# Patient Record
Sex: FEMALE | Race: WHITE | Employment: OTHER | URBAN - METROPOLITAN AREA
[De-identification: names, ages, dates, MRNs, and addresses within clinical notes are randomized per-mention and may not be internally consistent; named-entity substitution may affect disease eponyms.]

---

## 2017-04-17 ENCOUNTER — HOSPITAL ENCOUNTER (OUTPATIENT)
Dept: PHYSICAL THERAPY | Age: 82
End: 2017-04-17

## 2017-04-28 ENCOUNTER — HOSPITAL ENCOUNTER (OUTPATIENT)
Dept: PHYSICAL THERAPY | Age: 82
Discharge: HOME | End: 2017-04-28
Payer: MEDICARE

## 2017-04-28 PROCEDURE — G8982 BODY POS GOAL STATUS: HCPCS

## 2017-04-28 PROCEDURE — G8981 BODY POS CURRENT STATUS: HCPCS

## 2017-04-28 PROCEDURE — 97161 PT EVAL LOW COMPLEX 20 MIN: CPT

## 2017-04-28 NOTE — PROGRESS NOTES
Mimi Dickerson  : [address] [facility] [place] [id number] [facility], [address] [place] [address],[address], [place] [address].  [phone]   [phone]           OUTPATIENT PHYSICAL THERAPY:Initial Assessment 2017    ICD-10: Treatment Diagnosis: Other abnormalities of gait and mobility R26.89  Precautions/Allergies:   Review of patient's allergies indicates no known allergies. Fall Risk Score: 4 (? 5 = High Risk)  MD Orders: eval and treat MEDICAL/REFERRING DIAGNOSIS:  Balance problem [R26.89]   DATE OF ONSET: Patient unable to give time. REFERRING PHYSICIAN: Nikolay Radford MD  RETURN PHYSICIAN APPOINTMENT:      INITIAL ASSESSMENT:  Ms. Kermit Tomlinson presents with mild balance impairments which should improve with PT intervention. Patient requires some repetition of verbal instruction to complete motor commands. Patient lives alone. Patient would benefit from PT to address these problems to improve patient's independence and safety with mobility and daily activities. Thank you. PROBLEM LIST (Impacting functional limitations):  1. Decreased Strength  2. Decreased ADL/Functional Activities  3. Decreased Transfer Abilities  4. Decreased Ambulation Ability/Technique  5. Decreased Balance  6. Decreased [place] with Home Exercise Program INTERVENTIONS PLANNED:  1. Balance Exercise  2. Home Exercise Program (HEP)  3. Neuromuscular Re-education/Strengthening  4. Therapeutic Activites  5. Therapeutic Exercise/Strengthening   6. TREATMENT PLAN:  Effective Dates: 2017 TO 2017. Frequency/Duration: 1-2 times a week for 4-8 weeks  GOALS: (Goals have been discussed and agreed upon with patient.)  Short-Term Functional Goals: Time Frame: 2-3 weeks  1. Patient will demonstrate independence and compliance with home exercise program to improve balance and strength for daily activities. 2.   Discharge Goals: Time Frame: 4-8 weeks  1.  Patient will increase her score on the Hernandez Balance Scale to greater than or equal to 54/56 indicating improved safety and decreased fall risk for daily activities. 2. Patient will ambulate with least assistive device over level and unlevel surfaces without evidence of imbalance to improve safety for daily activities. 3. Patient will increase bilateral lower extremity strength to greater than or equal to 4+/5 to improve strength for functional mobility activities. 4.   Rehabilitation Potential For Stated Goals: Good  Regarding Cami Chino's therapy, I certify that the treatment plan above will be carried out by a therapist or under their direction. Thank you for this referral,  Avni Nicole PT     Referring Physician Signature: Bossman Arredondo MD              Date                    The information in this section was collected on 4/28/17 (except where otherwise noted). HISTORY:   History of Present Injury/Illness (Reason for Referral):  Dementia, Imbalance. Patient reports no falls and states no problems with balance. No AD. Past Medical History/Comorbidities:   Ms. Keven Tomas  has a past medical history of Unspecified hypothyroidism. Ms. Keven Tomas  has no past surgical history on file. Dementia. Social History/Living Environment:     lives alone with small dog. One story house. A few steps to enter with rail. Prior Level of Function/Work/Activity:  Independent. Patient states she drives short distances. Does own housecleaning and cooking. Dominant Side:         RIGHT    Current Medications:       Current Outpatient Prescriptions:     levothyroxine (SYNTHROID) 75 mcg tablet, TAKE ONE TABLET BY MOUTH ONCE DAILY. , Disp: 30 Tab, Rfl: 5    rivastigmine tartrate (EXELON) 6 mg capsule, Take 1 Cap by mouth two (2) times daily (with meals). , Disp: 60 Cap, Rfl: 5    escitalopram oxalate (LEXAPRO) 5 mg tablet, Take 1 Tab by mouth daily. , Disp: 30 Tab, Rfl: 5   Date Last Reviewed:  4/28/17   Number of Personal Factors/Comorbidities that affect the Plan of Care: 1-2: MODERATE COMPLEXITY   EXAMINATION:   Observation/Orthostatic Postural Assessment:          WNL    Strength:          LE STRENGTH:  4+/5 hip flexion, 4+/5 hip abduction, 4+/5 hip adduction, 4+/5 hip extension, 4+/5 knee extension, 4+/5 knee flexion, 4+/5 ankle dorsiflexion, 4+/5 ankle plantarflexion. Functional Mobility:         Gait/Ambulation:  Patient ambulates with no AD with a normal gait pattern. She demonstrates decreased step clearance at times. Transfers:  independent        Bed Mobility:  independent        Stairs:  With rail, step over step  Sensation:         intact  Postural Control & Balance:  · Haro Balance Scale:  50/56.   (A score less than 45/56 indicates high risk of falls)     · Dynamic Gait Index:  23/24.   (A score less than or equal to19/24 is abnormal and predictive of falls)          Body Structures Involved:  1. Nerves  2. Eyes and Ears  3. Muscles Body Functions Affected:  1. Neuromusculoskeletal  2. Movement Related Activities and Participation Affected:  1. Mobility  2. Domestic Life  3. Community, Social and [place]   Number of elements (examined above) that affect the Plan of Care: 1-2: LOW COMPLEXITY   CLINICAL PRESENTATION:   Presentation: Stable and uncomplicated: LOW COMPLEXITY   CLINICAL DECISION MAKING:   Outcome Measure: Tool Used: Haro Balance Scale  Score:  Initial: 50/56 Most Recent: X/56 (Date: -- )   Interpretation of Score: Each section is scored on a 0-4 scale, 0 representing the patients inability to perform the task and 4 representing independence. The scores of each section are added together for a total score of 56. The higher the patients score, the more independent the patient is. Any score below 45 indicates increased risk for falls. Score 56 55-45 44-34 33-23 22-12 11-1 0   Modifier CH CI CJ CK CL CM CN     ?  Changing and Maintaining Body Position:    [id number] - CURRENT STATUS: CI - 1%-19% impaired, limited or restricted    - GOAL STATUS: CI - 1%-19% impaired, limited or restricted    - D/C STATUS:  ---------------To be determined---------------    Tool Used: Dynamic Gait Index  Score:  Initial: 23/24 Most Recent: X/24 (Date: -- )   Interpretation of Score: Each section is scored on a 0-3 scale, 0 representing the patients inability to perform the task and 3 representing independence. The scores of each section are added together for a total score of 24. Any score below 19 indicates increased risk for falls. Score 24 23-19 18-15 14-10 9-5 4-1 0   Modifier CH CI CJ CK CL CM CN         Medical Necessity:   · Patient is expected to demonstrate progress in strength, balance and functional technique to improve safety during daily activities. Reason for Services/Other Comments:  · Patient continues to demonstrate capacity to improve strength, balance, gait which will increase independence and increase safety. Use of outcome tool(s) and clinical judgement create a POC that gives a: Clear prediction of patient's progress: LOW COMPLEXITY            TREATMENT:   (In addition to Assessment/Re-Assessment sessions the following treatments were rendered)  Pre-treatment Symptoms/Complaints:  No complaints. Doing well. Lives alone. Pain: Initial:   Pain Intensity 1: 0 0 Post Session:  0     Assessment only today, no treatment provided. Neuromuscular Re-education ( ):  Exercise/activities per grid below to improve balance, coordination and kinesthetic sense. Required minimal verbal cues to promote static and dynamic balance in standing. Treatment/Session Assessment:    · Response to Treatment:  Patient tolerated assessment well. Balance is fairly good, but tripped walking on tile in balance room. .  · Compliance with Program/Exercises: Will assess as treatment progresses. · Recommendations/Intent for next treatment session: \"Next visit will focus on advancements to more challenging activities\".   Total Treatment Duration:  PT Patient Time In/Time Out  Time In: 1008  Time Out: Tabitha Jackson 316 Rahel Carter, PT

## 2017-04-28 NOTE — PROGRESS NOTES
Ambulatory/Rehab Services H2 Model Falls Risk Assessment    Risk Factor Pts. ·   Confusion/Disorientation/Impulsivity  [x]    4 ·   Symptomatic Depression  []   2 ·   Altered Elimination  []   1 ·   Dizziness/Vertigo  []   1 ·   Gender (Male)  []   1 ·   Any administered antiepileptics (anticonvulsants):  []   2 ·   Any administered benzodiazepines:  []   1 ·   Visual Impairment (specify):  []   1 ·   Portable Oxygen Use  []   1 ·   Orthostatic ? BP  []   1 ·   History of Recent Falls (within 3 mos.)  []   5     Ability to Rise from Chair (choose one) Pts. ·   Ability to rise in a single movement  [x]   0 ·   Pushes up, successful in one attempt  []   1 ·   Multiple attempts, but successful  []   3 ·   Unable to rise without assistance  []   4   Total: (5 or greater = High Risk) 4     Falls Prevention Plan:   []                Physical Limitations to Exercise (specify):   []                Mobility Assistance Device (type):   []                Exercise/Equipment Adaptation (specify):    ©2010 [facility] [address] Patent #[id number].  Federal Law prohibits the replication, distribution or use without written permission from [facility] Identified

## 2017-07-19 NOTE — PROGRESS NOTES
Oscar Ortega  : [address] [facility] [place] [address] [facility], [address], [address] [facility]  [phone]   [phone]           OUTPATIENT PHYSICAL THERAPY:Discontinuation Summary 2017    ICD-10: Treatment Diagnosis: Other abnormalities of gait and mobility R26.89  Precautions/Allergies:   Review of patient's allergies indicates no known allergies. Fall Risk Score: 4 (? 5 = High Risk)  MD Orders: eval and treat MEDICAL/REFERRING DIAGNOSIS:  Balance problem [R26.89]   DATE OF ONSET: Patient unable to give time. REFERRING PHYSICIAN: Shyann Washington MD  RETURN PHYSICIAN APPOINTMENT:      Discontinuation 17:  Ms. Roe Arguello attended one PT session on 17 for mild balance impairments which should improve with PT intervention. Patient did not come back to PT after evaluation and plan of treatment has . We will discharge patient at this time. Thank you. PROBLEM LIST (Impacting functional limitations):  1. Decreased Strength  2. Decreased ADL/Functional Activities  3. Decreased Transfer Abilities  4. Decreased Ambulation Ability/Technique  5. Decreased Balance  6. Decreased [place] with Home Exercise Program INTERVENTIONS PLANNED:  1. Balance Exercise  2. Home Exercise Program (HEP)  3. Neuromuscular Re-education/Strengthening  4. Therapeutic Activites  5. Therapeutic Exercise/Strengthening   6. TREATMENT PLAN:  Effective Dates: 2017 TO 2017. Frequency/Duration: discharge  GOALS: (Goals have been discussed and agreed upon with patient.)  Short-Term Functional Goals: Time Frame: 2-3 weeks  1. Patient will demonstrate independence and compliance with home exercise program to improve balance and strength for daily activities. Unable to reassess  2. Discharge Goals: Time Frame: 4-8 weeks  1.  Patient will increase her score on the Haro Balance Scale to greater than or equal to 54/56 indicating improved safety and decreased fall risk for daily activities. Unable to reassess  2. Patient will ambulate with least assistive device over level and unlevel surfaces without evidence of imbalance to improve safety for daily activities. Unable to reassess  3. Patient will increase bilateral lower extremity strength to greater than or equal to 4+/5 to improve strength for functional mobility activities. Unable to reassess  4. Rehabilitation Potential For Stated Goals: Good              The information in this section was collected on 4/28/17 (except where otherwise noted). HISTORY:   History of Present Injury/Illness (Reason for Referral):  Dementia, Imbalance. Patient reports no falls and states no problems with balance. No AD. Past Medical History/Comorbidities:   Ms. Karla Cuenca  has a past medical history of Unspecified hypothyroidism. Ms. Karla Cuenca  has no past surgical history on file. Dementia. Social History/Living Environment:     lives alone with small dog. One story house. A few steps to enter with rail. Prior Level of Function/Work/Activity:  Independent. Patient states she drives short distances. Does own housecleaning and cooking. Dominant Side:         RIGHT    Current Medications:       Current Outpatient Prescriptions:     levothyroxine (SYNTHROID) 75 mcg tablet, TAKE ONE TABLET BY MOUTH ONCE DAILY. , Disp: 30 Tab, Rfl: 5    rivastigmine tartrate (EXELON) 6 mg capsule, Take 1 Cap by mouth two (2) times daily (with meals). , Disp: 60 Cap, Rfl: 5    escitalopram oxalate (LEXAPRO) 5 mg tablet, Take 1 Tab by mouth daily. , Disp: 30 Tab, Rfl: 5   Date Last Reviewed:  4/28/17   Number of Personal Factors/Comorbidities that affect the Plan of Care: 1-2: MODERATE COMPLEXITY   EXAMINATION:   Observation/Orthostatic Postural Assessment:          WNL    Strength:          LE STRENGTH:  4+/5 hip flexion, 4+/5 hip abduction, 4+/5 hip adduction, 4+/5 hip extension, 4+/5 knee extension, 4+/5 knee flexion, 4+/5 ankle dorsiflexion, 4+/5 ankle plantarflexion. Functional Mobility:         Gait/Ambulation:  Patient ambulates with no AD with a normal gait pattern. She demonstrates decreased step clearance at times. Transfers:  independent        Bed Mobility:  independent        Stairs:  With rail, step over step  Sensation:         intact  Postural Control & Balance:  · Haro Balance Scale:  50/56.   (A score less than 45/56 indicates high risk of falls)     · Dynamic Gait Index:  23/24.   (A score less than or equal to19/24 is abnormal and predictive of falls)          Body Structures Involved:  1. Nerves  2. Eyes and Ears  3. Muscles Body Functions Affected:  1. Neuromusculoskeletal  2. Movement Related Activities and Participation Affected:  1. Mobility  2. Domestic Life  3. Community, Social and [place]   Number of elements (examined above) that affect the Plan of Care: 1-2: LOW COMPLEXITY   CLINICAL PRESENTATION:   Presentation: Stable and uncomplicated: LOW COMPLEXITY   CLINICAL DECISION MAKING:   Outcome Measure: Tool Used: Haro Balance Scale  Score:  Initial: 50/56 Most Recent: X/56 (Date: -- )   Interpretation of Score: Each section is scored on a 0-4 scale, 0 representing the patients inability to perform the task and 4 representing independence. The scores of each section are added together for a total score of 56. The higher the patients score, the more independent the patient is. Any score below 45 indicates increased risk for falls. Score 56 55-45 44-34 33-23 22-12 11-1 0   Modifier CH CI CJ CK CL CM CN     ?  Changing and Maintaining Body Position:    [id number] - CURRENT STATUS: CI - 1%-19% impaired, limited or restricted    - GOAL STATUS: CI - 1%-19% impaired, limited or restricted    - D/C STATUS:  unable to reassess    Tool Used: Dynamic Gait Index  Score:  Initial: 23/24 Most Recent: X/24 (Date: -- )   Interpretation of Score: Each section is scored on a 0-3 scale, 0 representing the patients inability to perform the task and 3 representing independence. The scores of each section are added together for a total score of 24. Any score below 19 indicates increased risk for falls. Score 24 23-19 18-15 14-10 9-5 4-1 0   Modifier CH CI CJ CK CL CM CN         Medical Necessity:   · Patient is expected to demonstrate progress in strength, balance and functional technique to improve safety during daily activities. Reason for Services/Other Comments:  · Patient continues to demonstrate capacity to improve strength, balance, gait which will increase independence and increase safety.    Use of outcome tool(s) and clinical judgement create a POC that gives a: Clear prediction of patient's progress: LOW COMPLEXITY            TREATMENT:   (In addition to Assessment/Re-Assessment sessions the following treatments were rendered)    Jessica Steinberg, PT

## 2017-08-23 PROBLEM — R26.9 GAIT DISTURBANCE: Status: ACTIVE | Noted: 2017-08-23

## 2017-08-23 PROBLEM — F03.90 DEMENTIA WITHOUT BEHAVIORAL DISTURBANCE (HCC): Status: ACTIVE | Noted: 2017-08-23

## 2017-08-23 PROBLEM — Z79.899 ENCOUNTER FOR MEDICATION MANAGEMENT: Status: ACTIVE | Noted: 2017-08-23

## 2017-08-23 PROBLEM — G30.0 EARLY ONSET ALZHEIMER'S DISEASE WITH BEHAVIORAL DISTURBANCE (HCC): Status: ACTIVE | Noted: 2017-08-23

## 2017-08-23 PROBLEM — F02.818 EARLY ONSET ALZHEIMER'S DISEASE WITH BEHAVIORAL DISTURBANCE (HCC): Status: ACTIVE | Noted: 2017-08-23

## 2017-09-22 ENCOUNTER — HOME HEALTH ADMISSION (OUTPATIENT)
Dept: HOME HEALTH SERVICES | Facility: HOME HEALTH | Age: 82
End: 2017-09-22
Payer: MEDICARE

## 2017-09-23 ENCOUNTER — HOME CARE VISIT (OUTPATIENT)
Dept: SCHEDULING | Facility: HOME HEALTH | Age: 82
End: 2017-09-23
Payer: MEDICARE

## 2017-09-23 PROCEDURE — 400013 HH SOC

## 2017-09-23 PROCEDURE — 3331090001 HH PPS REVENUE CREDIT

## 2017-09-23 PROCEDURE — 3331090002 HH PPS REVENUE DEBIT

## 2017-09-23 PROCEDURE — G0299 HHS/HOSPICE OF RN EA 15 MIN: HCPCS

## 2017-09-24 PROCEDURE — 3331090002 HH PPS REVENUE DEBIT

## 2017-09-24 PROCEDURE — 3331090001 HH PPS REVENUE CREDIT

## 2017-09-25 PROCEDURE — 3331090002 HH PPS REVENUE DEBIT

## 2017-09-25 PROCEDURE — 3331090001 HH PPS REVENUE CREDIT

## 2017-09-26 ENCOUNTER — HOME CARE VISIT (OUTPATIENT)
Dept: SCHEDULING | Facility: HOME HEALTH | Age: 82
End: 2017-09-26
Payer: MEDICARE

## 2017-09-26 VITALS — RESPIRATION RATE: 20 BRPM | HEART RATE: 88 BPM

## 2017-09-26 PROCEDURE — 3331090001 HH PPS REVENUE CREDIT

## 2017-09-26 PROCEDURE — 3331090002 HH PPS REVENUE DEBIT

## 2017-09-26 PROCEDURE — G0299 HHS/HOSPICE OF RN EA 15 MIN: HCPCS

## 2017-09-26 PROCEDURE — G0156 HHCP-SVS OF AIDE,EA 15 MIN: HCPCS

## 2017-09-27 ENCOUNTER — HOME CARE VISIT (OUTPATIENT)
Dept: SCHEDULING | Facility: HOME HEALTH | Age: 82
End: 2017-09-27
Payer: MEDICARE

## 2017-09-27 VITALS
RESPIRATION RATE: 16 BRPM | TEMPERATURE: 97.2 F | HEART RATE: 90 BPM | SYSTOLIC BLOOD PRESSURE: 100 MMHG | DIASTOLIC BLOOD PRESSURE: 62 MMHG | OXYGEN SATURATION: 98 %

## 2017-09-27 VITALS
DIASTOLIC BLOOD PRESSURE: 68 MMHG | TEMPERATURE: 97.6 F | SYSTOLIC BLOOD PRESSURE: 110 MMHG | HEART RATE: 64 BPM | RESPIRATION RATE: 17 BRPM

## 2017-09-27 PROCEDURE — 3331090002 HH PPS REVENUE DEBIT

## 2017-09-27 PROCEDURE — 3331090001 HH PPS REVENUE CREDIT

## 2017-09-27 PROCEDURE — G0151 HHCP-SERV OF PT,EA 15 MIN: HCPCS

## 2017-09-28 ENCOUNTER — HOME CARE VISIT (OUTPATIENT)
Dept: SCHEDULING | Facility: HOME HEALTH | Age: 82
End: 2017-09-28
Payer: MEDICARE

## 2017-09-28 PROCEDURE — 3331090002 HH PPS REVENUE DEBIT

## 2017-09-28 PROCEDURE — 3331090001 HH PPS REVENUE CREDIT

## 2017-09-28 PROCEDURE — G0155 HHCP-SVS OF CSW,EA 15 MIN: HCPCS

## 2017-09-29 ENCOUNTER — HOME CARE VISIT (OUTPATIENT)
Dept: SCHEDULING | Facility: HOME HEALTH | Age: 82
End: 2017-09-29
Payer: MEDICARE

## 2017-09-29 PROCEDURE — G0299 HHS/HOSPICE OF RN EA 15 MIN: HCPCS

## 2017-09-29 PROCEDURE — 3331090002 HH PPS REVENUE DEBIT

## 2017-09-29 PROCEDURE — 3331090001 HH PPS REVENUE CREDIT

## 2017-09-30 ENCOUNTER — HOME CARE VISIT (OUTPATIENT)
Dept: HOME HEALTH SERVICES | Facility: HOME HEALTH | Age: 82
End: 2017-09-30
Payer: MEDICARE

## 2017-09-30 PROCEDURE — 3331090002 HH PPS REVENUE DEBIT

## 2017-09-30 PROCEDURE — 3331090001 HH PPS REVENUE CREDIT

## 2017-10-01 PROCEDURE — 3331090002 HH PPS REVENUE DEBIT

## 2017-10-01 PROCEDURE — 3331090001 HH PPS REVENUE CREDIT

## 2017-10-02 PROCEDURE — 3331090002 HH PPS REVENUE DEBIT

## 2017-10-02 PROCEDURE — 3331090001 HH PPS REVENUE CREDIT

## 2017-10-03 ENCOUNTER — HOME CARE VISIT (OUTPATIENT)
Dept: SCHEDULING | Facility: HOME HEALTH | Age: 82
End: 2017-10-03
Payer: MEDICARE

## 2017-10-03 VITALS
DIASTOLIC BLOOD PRESSURE: 72 MMHG | OXYGEN SATURATION: 98 % | RESPIRATION RATE: 17 BRPM | HEART RATE: 72 BPM | SYSTOLIC BLOOD PRESSURE: 118 MMHG | TEMPERATURE: 97.5 F

## 2017-10-03 PROCEDURE — 3331090001 HH PPS REVENUE CREDIT

## 2017-10-03 PROCEDURE — 3331090002 HH PPS REVENUE DEBIT

## 2017-10-03 PROCEDURE — G0299 HHS/HOSPICE OF RN EA 15 MIN: HCPCS

## 2017-10-04 VITALS
RESPIRATION RATE: 16 BRPM | TEMPERATURE: 97.8 F | DIASTOLIC BLOOD PRESSURE: 68 MMHG | HEART RATE: 78 BPM | SYSTOLIC BLOOD PRESSURE: 120 MMHG | OXYGEN SATURATION: 99 %

## 2017-10-04 PROCEDURE — 3331090001 HH PPS REVENUE CREDIT

## 2017-10-04 PROCEDURE — 3331090002 HH PPS REVENUE DEBIT

## 2017-10-05 PROCEDURE — 3331090002 HH PPS REVENUE DEBIT

## 2017-10-05 PROCEDURE — 3331090001 HH PPS REVENUE CREDIT

## 2017-10-06 ENCOUNTER — HOME CARE VISIT (OUTPATIENT)
Dept: SCHEDULING | Facility: HOME HEALTH | Age: 82
End: 2017-10-06
Payer: MEDICARE

## 2017-10-06 PROCEDURE — 3331090002 HH PPS REVENUE DEBIT

## 2017-10-06 PROCEDURE — 3331090001 HH PPS REVENUE CREDIT

## 2017-10-07 PROCEDURE — 3331090002 HH PPS REVENUE DEBIT

## 2017-10-07 PROCEDURE — 3331090001 HH PPS REVENUE CREDIT

## 2017-10-08 PROCEDURE — 3331090002 HH PPS REVENUE DEBIT

## 2017-10-08 PROCEDURE — 3331090001 HH PPS REVENUE CREDIT

## 2017-10-09 PROCEDURE — 3331090002 HH PPS REVENUE DEBIT

## 2017-10-09 PROCEDURE — 3331090001 HH PPS REVENUE CREDIT

## 2017-10-10 PROCEDURE — 3331090002 HH PPS REVENUE DEBIT

## 2017-10-10 PROCEDURE — 3331090001 HH PPS REVENUE CREDIT

## 2017-10-11 ENCOUNTER — HOME CARE VISIT (OUTPATIENT)
Dept: SCHEDULING | Facility: HOME HEALTH | Age: 82
End: 2017-10-11
Payer: MEDICARE

## 2017-10-11 PROCEDURE — 3331090001 HH PPS REVENUE CREDIT

## 2017-10-11 PROCEDURE — 3331090002 HH PPS REVENUE DEBIT

## 2017-10-11 PROCEDURE — G0299 HHS/HOSPICE OF RN EA 15 MIN: HCPCS

## 2017-10-12 VITALS
DIASTOLIC BLOOD PRESSURE: 60 MMHG | OXYGEN SATURATION: 98 % | RESPIRATION RATE: 16 BRPM | TEMPERATURE: 98.7 F | HEART RATE: 66 BPM | SYSTOLIC BLOOD PRESSURE: 108 MMHG

## 2017-10-12 PROCEDURE — 3331090002 HH PPS REVENUE DEBIT

## 2017-10-12 PROCEDURE — 3331090001 HH PPS REVENUE CREDIT

## 2017-10-13 PROCEDURE — 3331090001 HH PPS REVENUE CREDIT

## 2017-10-13 PROCEDURE — 3331090002 HH PPS REVENUE DEBIT

## 2017-10-14 ENCOUNTER — HOSPITAL ENCOUNTER (EMERGENCY)
Age: 82
Discharge: HOME | End: 2017-10-14
Attending: EMERGENCY MEDICINE
Payer: MEDICARE

## 2017-10-14 VITALS
TEMPERATURE: 97.9 F | OXYGEN SATURATION: 100 % | WEIGHT: 95 LBS | HEART RATE: 65 BPM | DIASTOLIC BLOOD PRESSURE: 59 MMHG | BODY MASS INDEX: 18.65 KG/M2 | HEIGHT: 60 IN | SYSTOLIC BLOOD PRESSURE: 137 MMHG | RESPIRATION RATE: 16 BRPM

## 2017-10-14 DIAGNOSIS — R63.0 DECREASED APPETITE: ICD-10-CM

## 2017-10-14 DIAGNOSIS — G30.9 ALZHEIMER'S DEMENTIA WITH BEHAVIORAL DISTURBANCE, UNSPECIFIED TIMING OF DEMENTIA ONSET: Primary | ICD-10-CM

## 2017-10-14 DIAGNOSIS — F02.81 ALZHEIMER'S DEMENTIA WITH BEHAVIORAL DISTURBANCE, UNSPECIFIED TIMING OF DEMENTIA ONSET: Primary | ICD-10-CM

## 2017-10-14 LAB
ALBUMIN SERPL-MCNC: 3.9 G/DL (ref 3.2–4.6)
ALBUMIN/GLOB SERPL: 1.2 {RATIO} (ref 1.2–3.5)
ALP SERPL-CCNC: 61 U/L (ref 50–136)
ALT SERPL-CCNC: 22 U/L (ref 12–65)
ANION GAP SERPL CALC-SCNC: 8 MMOL/L (ref 7–16)
AST SERPL-CCNC: 24 U/L (ref 15–37)
BACTERIA URNS QL MICRO: 0 /HPF
BASOPHILS # BLD: 0 K/UL (ref 0–0.2)
BASOPHILS NFR BLD: 0 % (ref 0–2)
BILIRUB SERPL-MCNC: 0.7 MG/DL (ref 0.2–1.1)
BUN SERPL-MCNC: 20 MG/DL (ref 8–23)
CALCIUM SERPL-MCNC: 9.3 MG/DL (ref 8.3–10.4)
CASTS URNS QL MICRO: NORMAL /LPF
CHLORIDE SERPL-SCNC: 100 MMOL/L (ref 98–107)
CO2 SERPL-SCNC: 29 MMOL/L (ref 21–32)
CREAT SERPL-MCNC: 0.79 MG/DL (ref 0.6–1)
DIFFERENTIAL METHOD BLD: ABNORMAL
EOSINOPHIL # BLD: 0 K/UL (ref 0–0.8)
EOSINOPHIL NFR BLD: 0 % (ref 0.5–7.8)
EPI CELLS #/AREA URNS HPF: NORMAL /HPF
ERYTHROCYTE [DISTWIDTH] IN BLOOD BY AUTOMATED COUNT: 12.9 % (ref 11.9–14.6)
GLOBULIN SER CALC-MCNC: 3.3 G/DL (ref 2.3–3.5)
GLUCOSE SERPL-MCNC: 84 MG/DL (ref 65–100)
HCT VFR BLD AUTO: 43.9 % (ref 35.8–46.3)
HGB BLD-MCNC: 15.3 G/DL (ref 11.7–15.4)
IMM GRANULOCYTES # BLD: 0 K/UL (ref 0–0.5)
IMM GRANULOCYTES NFR BLD: 0.2 % (ref 0–5)
LIPASE SERPL-CCNC: 195 U/L (ref 73–393)
LYMPHOCYTES # BLD: 1.7 K/UL (ref 0.5–4.6)
LYMPHOCYTES NFR BLD: 35 % (ref 13–44)
MAGNESIUM SERPL-MCNC: 1.8 MG/DL (ref 1.8–2.4)
MCH RBC QN AUTO: 33.2 PG (ref 26.1–32.9)
MCHC RBC AUTO-ENTMCNC: 34.9 G/DL (ref 31.4–35)
MCV RBC AUTO: 95.2 FL (ref 79.6–97.8)
MONOCYTES # BLD: 0.8 K/UL (ref 0.1–1.3)
MONOCYTES NFR BLD: 16 % (ref 4–12)
NEUTS SEG # BLD: 2.4 K/UL (ref 1.7–8.2)
NEUTS SEG NFR BLD: 49 % (ref 43–78)
PHOSPHATE SERPL-MCNC: 3.2 MG/DL (ref 2.3–3.7)
PLATELET # BLD AUTO: 221 K/UL (ref 150–450)
PMV BLD AUTO: 10.3 FL (ref 10.8–14.1)
POTASSIUM SERPL-SCNC: 3.9 MMOL/L (ref 3.5–5.1)
PROT SERPL-MCNC: 7.2 G/DL (ref 6.3–8.2)
RBC # BLD AUTO: 4.61 M/UL (ref 4.05–5.25)
RBC #/AREA URNS HPF: NORMAL /HPF
SODIUM SERPL-SCNC: 137 MMOL/L (ref 136–145)
WBC # BLD AUTO: 4.9 K/UL (ref 4.3–11.1)
WBC URNS QL MICRO: NORMAL /HPF

## 2017-10-14 PROCEDURE — 3331090001 HH PPS REVENUE CREDIT

## 2017-10-14 PROCEDURE — 84100 ASSAY OF PHOSPHORUS: CPT | Performed by: EMERGENCY MEDICINE

## 2017-10-14 PROCEDURE — 83735 ASSAY OF MAGNESIUM: CPT | Performed by: EMERGENCY MEDICINE

## 2017-10-14 PROCEDURE — 99283 EMERGENCY DEPT VISIT LOW MDM: CPT | Performed by: EMERGENCY MEDICINE

## 2017-10-14 PROCEDURE — 85025 COMPLETE CBC W/AUTO DIFF WBC: CPT | Performed by: EMERGENCY MEDICINE

## 2017-10-14 PROCEDURE — 83690 ASSAY OF LIPASE: CPT | Performed by: EMERGENCY MEDICINE

## 2017-10-14 PROCEDURE — 3331090002 HH PPS REVENUE DEBIT

## 2017-10-14 PROCEDURE — 81003 URINALYSIS AUTO W/O SCOPE: CPT | Performed by: EMERGENCY MEDICINE

## 2017-10-14 PROCEDURE — 80053 COMPREHEN METABOLIC PANEL: CPT | Performed by: EMERGENCY MEDICINE

## 2017-10-14 PROCEDURE — 81015 MICROSCOPIC EXAM OF URINE: CPT | Performed by: EMERGENCY MEDICINE

## 2017-10-14 NOTE — ED PROVIDER NOTES
HPI Comments: 79 yo female with history of Alzheimer's presents with decreased appetite that is progressively worsened over the course the past 3 weeks. Family present at bedside state that she only has 1 small meal about every 3 days. Patient states that she just isn't hungry. Family states that they have gotten her favorite foods and she still will not eat. States that the patient had 2 cups of milk. Patient recently treated for UTI with Keflex. Patient denies chest pain, shortness of breath, nausea, vomiting, headache, dizziness, focal weakness, abdominal pain, constipation, fever, chills, dysuria. Patient reportedly has intermittent loose stools for the past several days. Rates symptoms as mild to moderate. Them stating that she has lost 4 pounds since last doctor's appointment. The history is provided by the patient and a relative. No  was used. Past Medical History:   Diagnosis Date    Unspecified hypothyroidism        History reviewed. No pertinent surgical history. Family History:   Problem Relation Age of Onset    No Known Problems Mother     No Known Problems Father        Social History     Social History    Marital status:      Spouse name: N/A    Number of children: N/A    Years of education: N/A     Occupational History    Not on file. Social History Main Topics    Smoking status: Never Smoker    Smokeless tobacco: Never Used    Alcohol use No    Drug use: No    Sexual activity: Not on file     Other Topics Concern    Not on file     Social History Narrative         ALLERGIES: Review of patient's allergies indicates no known allergies. Review of Systems   Constitutional: Positive for appetite change and unexpected weight change. Negative for chills, fatigue and fever. HENT: Negative for congestion, rhinorrhea and trouble swallowing. Respiratory: Negative for cough and shortness of breath.     Cardiovascular: Negative for chest pain, palpitations and leg swelling. Gastrointestinal: Negative for abdominal distention, abdominal pain, blood in stool, constipation, diarrhea, nausea and vomiting. Endocrine: Negative for polydipsia and polyphagia. Genitourinary: Negative for dysuria, flank pain, genital sores, hematuria, pelvic pain, vaginal bleeding and vaginal discharge. Musculoskeletal: Negative for back pain, gait problem and joint swelling. Skin: Negative for pallor and rash. Neurological: Negative for dizziness, seizures, syncope, weakness, numbness and headaches. Vitals:    10/14/17 1730 10/14/17 2116   BP: 109/56 137/59   Pulse: 90 65   Resp: 16 16   Temp: 97.6 °F (36.4 °C) 97.9 °F (36.6 °C)   SpO2: 98% 100%   Weight: 43.1 kg (95 lb)    Height: 5' (1.524 m)             Physical Exam   Constitutional: She appears well-developed and well-nourished. No distress. Thin in appearance   HENT:   Head: Normocephalic and atraumatic. Mouth/Throat: Oropharynx is clear and moist. No oropharyngeal exudate. Eyes: Conjunctivae and EOM are normal. Pupils are equal, round, and reactive to light. Neck: Normal range of motion. No JVD present. No tracheal deviation present. Cardiovascular: Normal rate, regular rhythm, normal heart sounds and intact distal pulses. No murmur heard. Radial pulses 2+ and equal bilaterally   Pulmonary/Chest: Effort normal and breath sounds normal. No respiratory distress. She has no wheezes. She has no rales. She exhibits no tenderness. Abdominal: Soft. Bowel sounds are normal. She exhibits no distension. There is no tenderness. There is no rebound and no guarding. No CVAT   Musculoskeletal: Normal range of motion. She exhibits no edema, tenderness or deformity. Neurological: She is alert. No cranial nerve deficit. Coordination normal.   Oriented to person and place. Patient at baseline mental status. Strength 5/5 throughout. Normal sensory   Skin: Skin is warm and dry. No rash noted.  No erythema. No pallor. Psychiatric: She has a normal mood and affect. Her behavior is normal.   Nursing note and vitals reviewed. MDM  Number of Diagnoses or Management Options  Alzheimer's dementia with behavioral disturbance, unspecified timing of dementia onset:   Decreased appetite: new and requires workup  Diagnosis management comments: Patient with dementia presenting with decreased appetite ×3 weeks. Daughter present at bedside. Daughter power of . Vital signs stable. Patient at baseline mental status. Workup negative for any electrorlyte abnormalities. UA negative for UTI. Informed daughter and patient findings. Daughter states that she will follow up with primary care physician in the next 1-2 days. Amount and/or Complexity of Data Reviewed  Clinical lab tests: ordered and reviewed  Tests in the medicine section of CPT®: ordered and reviewed  Review and summarize past medical records: yes  Independent visualization of images, tracings, or specimens: yes    Risk of Complications, Morbidity, and/or Mortality  Presenting problems: moderate  Diagnostic procedures: moderate  Management options: moderate    Patient Progress  Patient progress: stable    ED Course   Comment By Time   UA w/ 0 bacteria. All labs within normal limits. No electrolyte abnormalities noted.  Randi Tapia MD 10/14 2104       Procedures

## 2017-10-14 NOTE — ED TRIAGE NOTES
Pt is alzheimers pt x 4 years. Pt lives with daughter and states pt eats very little since she has been with her for last 1.5 months.

## 2017-10-15 PROCEDURE — 3331090002 HH PPS REVENUE DEBIT

## 2017-10-15 PROCEDURE — 3331090001 HH PPS REVENUE CREDIT

## 2017-10-15 NOTE — ED NOTES
I have reviewed discharge instructions with the caregiver. The caregiver verbalized understanding. Patient left ED via Discharge Method: ambulatory to Home with (POA). Opportunity for questions and clarification provided. Patient given 0 scripts.

## 2017-10-15 NOTE — DISCHARGE INSTRUCTIONS
Anorexia: Care Instructions  Your Care Instructions  Anorexia is a type of eating disorder. People who have anorexia don't eat enough to stay at a normal weight. Sometimes they also exercise too much. They do these things because they're so afraid of gaining weight. They believe that they're fat, even when they're thin. Often they think that losing even more weight will solve their problems and make their lives better. If you have anorexia, it can really harm your health. This is because your body doesn't get the nutrition it needs. The first step to controlling the problem is admitting that something is wrong. Then counseling can help you change how you think about food, the way you see your body, and any other emotional issues. It may take months or years, but you can recover from anorexia. Follow-up care is a key part of your treatment and safety. Be sure to make and go to all appointments, and call your doctor if you are having problems. It's also a good idea to know your test results and keep a list of the medicines you take. How can you care for yourself at home? Follow your treatment plan  · Go to your counseling sessions. Call or talk with your counselor if you can't go or if you think the sessions aren't helping. Don't just stop going. · Take your medicines exactly as prescribed. Call your doctor if you think you are having a problem with your medicine. You will get more details on the specific medicines your doctor prescribes. Trust people who are helping you  · Listen to what counselors and nutrition experts say about healthy eating. · Learn about what is included in a balanced diet. Then discuss what you learn. · Let people know how you are feeling. Listen to how they are feeling too. · Accept support and feedback from other people. Learn to be easier on yourself  · Learn to focus on your good qualities. · If your body feels weak, slow down and do less.   · Remind yourself that feeling bad about yourself is all part of your disorder. · Remember that it takes time to recover from anorexia. · Spend time with other people doing things you enjoy. · Try to focus on one goal at a time. · Don't blame yourself for your disorder. Develop healthy eating habits  · With your doctor and counselor, you will decide on a good weight for you. You will also decide how much weight you will gain each week. · Try not to argue with the people you eat with. Arguing increases stress. And stress can make make it harder for you to relax and eat. · Talk with other people during meals about things that interest you. Don't talk about food or gaining weight. Caring for a loved one with anorexia  · Remind yourself that anorexia is a long-lasting disorder. It takes time for changes to occur. · Show love and support for your loved one, especially if he or she gets discouraged. · Support your loved one as he or she goes through the steps of recovery. Focus on wellness. Don't focus on food. · Take care of yourself. Continue with your own interests, career, hobbies, and friends. · Don't blame yourself or look for the reason for the disorder. · If you are having a hard time, talk with a counselor. · Learn what to do if your loved one relapses. When should you call for help? Call 911 anytime you think you may need emergency care. For example, call if:  · A person with anorexia seems depressed and is talking about suicide. If the talk about suicide seems real, stay with the person, or ask someone you trust to stay with the person, until you get emergency help. · You have a rapid or irregular heartbeat. · You passed out (lost consciousness). Call your doctor now or seek immediate medical care if:  · You feel hopeless or have thoughts of hurting yourself. Watch closely for changes in your health, and be sure to contact your doctor if:  · You have trouble sleeping. · You feel anxious or depressed.   Where can you learn more?  Go to http://soraida-jared.info/. Enter C888 in the search box to learn more about \"Anorexia: Care Instructions. \"  Current as of: July 26, 2016  Content Version: 11.3  © 9832-1027 [employer]. Care instructions adapted under license by [employer] (which disclaims liability or warranty for this information). If you have questions about a medical condition or this instruction, always ask your healthcare professional. [address] any warranty or liability for your use of this information. Alzheimer's Disease: Care Instructions  Your Care Instructions  Alzheimer's disease is a type of dementia. It causes memory loss and affects judgment, language, and behavior. You may have trouble making decisions or may get lost in places that you used to know well. Alzheimer's disease is different than mild memory loss that occurs with aging. It is not clear what causes Alzheimer's disease, but it is the most common form of dementia in older adults. Finding out that you have this disease is a shock. You may be afraid and worried about how the condition will change your life. Although there is no cure at this time, medicine in some cases may slow memory loss for a while. Other medicines may be able to help you sleep or cope with depression and behavior changes. Alzheimer's disease is different for everyone. It may take many years to develop. In some cases, people can function well for a long time. In the early stage of the disease, you can do things at home to make life easier and safer. You also can keep doing your hobbies and other activities. Many people find comfort in planning now for their future needs. Follow-up care is a key part of your treatment and safety. Be sure to make and go to all appointments, and call your doctor if you are having problems. Its also a good idea to know your test results and keep a list of the medicines you take.   How can you care for yourself at home? Taking care of yourself  · If your doctor gives you medicines, take them exactly as prescribed. Call your doctor if you think you are having a problem with your medicine. You will get more details on the medicines your doctor prescribes. · Eat a balanced diet. Get plenty of whole grains, fruits, and vegetables every day. If you are not hungry at mealtimes, eat snacks at midmorning and in the afternoon. Try drinks such as Boost, Ensure, or Sustacal if you are having trouble keeping your weight up. · Stay active. Exercise such as walking may slow the decline of your mental abilities. Try to stay active mentally too. Read and work crossword puzzles if you enjoy these activities. · If you have trouble sleeping, do not nap during the day. Get regular exercise (but not within several hours of bedtime). Drink a glass of warm milk or caffeine-free herbal tea before going to bed. · Ask your doctor about support groups and other resources in your area. They can help people who have Alzheimer's disease and their families. · Be patient. You may find that a task takes you longer than it used to. · If you have not already done so, make a list of advance directives. Advance directives are instructions to your doctor and family members about what kind of care you want if you become unable to speak or express yourself. Talk to a  about making a will, if you do not already have one. Keeping schedules  · Develop a routine. You will feel less frustrated or confused if you have a clear, simple plan of what to do every day. ¨ Make lists of your medicines and when to take them. ¨ Write down appointments and other tasks in a calendar. ¨ Put sticky notes around the house to help you remember events and other things you have to do. ¨ Schedule activities and tasks for times of the day when you are best able to handle them.   Staying safe  · Tell someone when you are going out and where you are going. Let the person know when you will be back. Before you go out alone, write down where you are going, how to get there, and how to get back home. Do this even if you have gone there many times before. Take someone along with you when possible. · Make your home safe. Tack down rugs, put no-slip tape in the tub, use handrails, and put safety switches on stoves and appliances. · Have a family member or other caregiver tell you whether you are driving badly. Deciding to stop driving is very hard for many people. Driving helps you feel independent. Your state s license bureau can do a driving test if there is any question. Plan for other means of getting around when you are no longer able to drive. · Use strong lighting, especially at night. Put night-lights in bedrooms, hallways, and bathrooms. · Lower the hot water temperature setting to 120°F or lower to avoid burns. When should you call for help? Call 911 anytime you think you may need emergency care. For example, call if:  · You are lost and do not know whom to call. · You are injured and do not know whom to call. Call your doctor now or seek immediate medical care if:  · Your symptoms suddenly get much worse. Watch closely for changes in your health, and be sure to contact your doctor if:  · You want more information about how you can take care of yourself. Where can you learn more? Go to http://soraida-jared.info/. Enter Y179 in the search box to learn more about \"Alzheimer's Disease: Care Instructions. \"  Current as of: July 26, 2016  Content Version: 11.3  © 6248-0970 [employer]. Care instructions adapted under license by [employer] (which disclaims liability or warranty for this information).  If you have questions about a medical condition or this instruction, always ask your healthcare professional. [address] any warranty or liability for your use of this information.

## 2017-10-16 PROCEDURE — 3331090002 HH PPS REVENUE DEBIT

## 2017-10-16 PROCEDURE — 3331090001 HH PPS REVENUE CREDIT

## 2017-10-17 PROCEDURE — 3331090001 HH PPS REVENUE CREDIT

## 2017-10-17 PROCEDURE — 3331090002 HH PPS REVENUE DEBIT

## 2017-10-18 ENCOUNTER — HOME CARE VISIT (OUTPATIENT)
Dept: SCHEDULING | Facility: HOME HEALTH | Age: 82
End: 2017-10-18
Payer: MEDICARE

## 2017-10-18 VITALS
OXYGEN SATURATION: 98 % | DIASTOLIC BLOOD PRESSURE: 70 MMHG | TEMPERATURE: 98.4 F | HEART RATE: 71 BPM | SYSTOLIC BLOOD PRESSURE: 102 MMHG | RESPIRATION RATE: 16 BRPM

## 2017-10-18 PROCEDURE — G0299 HHS/HOSPICE OF RN EA 15 MIN: HCPCS

## 2017-10-18 PROCEDURE — 3331090002 HH PPS REVENUE DEBIT

## 2017-10-18 PROCEDURE — 3331090001 HH PPS REVENUE CREDIT

## 2017-10-19 PROCEDURE — 3331090001 HH PPS REVENUE CREDIT

## 2017-10-19 PROCEDURE — 3331090002 HH PPS REVENUE DEBIT

## 2017-10-20 PROCEDURE — 3331090002 HH PPS REVENUE DEBIT

## 2017-10-20 PROCEDURE — 3331090001 HH PPS REVENUE CREDIT

## 2017-10-21 PROCEDURE — 3331090001 HH PPS REVENUE CREDIT

## 2017-10-21 PROCEDURE — 3331090002 HH PPS REVENUE DEBIT

## 2017-10-22 PROCEDURE — 3331090001 HH PPS REVENUE CREDIT

## 2017-10-22 PROCEDURE — 3331090002 HH PPS REVENUE DEBIT

## 2017-10-23 PROCEDURE — 3331090002 HH PPS REVENUE DEBIT

## 2017-10-23 PROCEDURE — 3331090001 HH PPS REVENUE CREDIT

## 2017-10-24 PROCEDURE — 3331090002 HH PPS REVENUE DEBIT

## 2017-10-24 PROCEDURE — 3331090001 HH PPS REVENUE CREDIT

## 2017-10-25 PROCEDURE — 3331090001 HH PPS REVENUE CREDIT

## 2017-10-25 PROCEDURE — 3331090002 HH PPS REVENUE DEBIT

## 2017-10-26 PROCEDURE — 3331090001 HH PPS REVENUE CREDIT

## 2017-10-26 PROCEDURE — 3331090002 HH PPS REVENUE DEBIT

## 2017-10-27 ENCOUNTER — HOME CARE VISIT (OUTPATIENT)
Dept: SCHEDULING | Facility: HOME HEALTH | Age: 82
End: 2017-10-27
Payer: MEDICARE

## 2017-10-27 PROCEDURE — 3331090001 HH PPS REVENUE CREDIT

## 2017-10-27 PROCEDURE — 3331090002 HH PPS REVENUE DEBIT

## 2017-10-28 PROCEDURE — 3331090002 HH PPS REVENUE DEBIT

## 2017-10-28 PROCEDURE — 3331090001 HH PPS REVENUE CREDIT

## 2017-10-29 PROCEDURE — 3331090001 HH PPS REVENUE CREDIT

## 2017-10-29 PROCEDURE — 3331090002 HH PPS REVENUE DEBIT

## 2017-10-30 PROCEDURE — 3331090001 HH PPS REVENUE CREDIT

## 2017-10-30 PROCEDURE — 3331090002 HH PPS REVENUE DEBIT

## 2017-10-31 PROCEDURE — 3331090002 HH PPS REVENUE DEBIT

## 2017-10-31 PROCEDURE — 3331090001 HH PPS REVENUE CREDIT

## 2017-11-01 PROCEDURE — 3331090001 HH PPS REVENUE CREDIT

## 2017-11-01 PROCEDURE — 3331090002 HH PPS REVENUE DEBIT

## 2017-11-02 PROCEDURE — 3331090001 HH PPS REVENUE CREDIT

## 2017-11-02 PROCEDURE — 3331090002 HH PPS REVENUE DEBIT

## 2017-11-03 PROCEDURE — 3331090001 HH PPS REVENUE CREDIT

## 2017-11-03 PROCEDURE — 3331090002 HH PPS REVENUE DEBIT

## 2017-11-04 PROCEDURE — 3331090002 HH PPS REVENUE DEBIT

## 2017-11-04 PROCEDURE — 3331090001 HH PPS REVENUE CREDIT
